# Patient Record
Sex: FEMALE | Race: BLACK OR AFRICAN AMERICAN | NOT HISPANIC OR LATINO | URBAN - METROPOLITAN AREA
[De-identification: names, ages, dates, MRNs, and addresses within clinical notes are randomized per-mention and may not be internally consistent; named-entity substitution may affect disease eponyms.]

---

## 2021-08-28 ENCOUNTER — TELEPHONE ENCOUNTER (OUTPATIENT)
Dept: URBAN - METROPOLITAN AREA CLINIC 13 | Facility: CLINIC | Age: 46
End: 2021-08-28

## 2021-08-29 ENCOUNTER — TELEPHONE ENCOUNTER (OUTPATIENT)
Dept: URBAN - METROPOLITAN AREA CLINIC 13 | Facility: CLINIC | Age: 46
End: 2021-08-29

## 2023-01-12 ENCOUNTER — CLAIMS CREATED FROM THE CLAIM WINDOW (OUTPATIENT)
Dept: URBAN - METROPOLITAN AREA TELEHEALTH 2 | Facility: TELEHEALTH | Age: 48
End: 2023-01-12
Payer: COMMERCIAL

## 2023-01-12 ENCOUNTER — LAB OUTSIDE AN ENCOUNTER (OUTPATIENT)
Dept: URBAN - METROPOLITAN AREA CLINIC 48 | Facility: CLINIC | Age: 48
End: 2023-01-12

## 2023-01-12 ENCOUNTER — DASHBOARD ENCOUNTERS (OUTPATIENT)
Age: 48
End: 2023-01-12

## 2023-01-12 VITALS — HEIGHT: 64 IN | WEIGHT: 207 LBS | BODY MASS INDEX: 35.34 KG/M2

## 2023-01-12 DIAGNOSIS — Z12.11 COLON CANCER SCREENING: ICD-10-CM

## 2023-01-12 DIAGNOSIS — Z80.0 FAMILY HISTORY OF COLON CANCER IN FATHER: ICD-10-CM

## 2023-01-12 DIAGNOSIS — D50.0 IRON DEFICIENCY ANEMIA DUE TO CHRONIC BLOOD LOSS: ICD-10-CM

## 2023-01-12 PROBLEM — 312824007: Status: ACTIVE | Noted: 2023-01-12

## 2023-01-12 PROCEDURE — 99204 OFFICE O/P NEW MOD 45 MIN: CPT | Performed by: INTERNAL MEDICINE

## 2023-01-12 RX ORDER — TIRZEPATIDE 10 MG/.5ML
INJECT 0.5 ML (10 MG) UNDER THE SKIN 1 (ONE) TIME PER WEEK INJECTION, SOLUTION SUBCUTANEOUS
Qty: 2 MILLILITER | Refills: 0 | Status: ACTIVE | COMMUNITY

## 2023-01-12 RX ORDER — FLUTICASONE PROPIONATE 50 UG/1
SPRAY 1 SPRAY INTO EACH NOSTRIL EVERY DAY FOR 30 DAYS SPRAY, METERED NASAL
Qty: 16 GRAM | Refills: 3 | Status: ACTIVE | COMMUNITY

## 2023-01-12 RX ORDER — HYDROCHLOROTHIAZIDE 12.5 MG/1
CAPSULE ORAL
Qty: 90 CAPSULE | Status: ACTIVE | COMMUNITY

## 2023-01-12 RX ORDER — FERROUS SULFATE 325(65) MG
TAKE 1 TABLET BY MOUTH EVERY DAY FOR 90 DAYS TABLET ORAL
Qty: 30 EACH | Refills: 5 | Status: ACTIVE | COMMUNITY

## 2023-01-12 RX ORDER — LORATADINE 10 MG/1
TAKE 1 TABLET BY MOUTH EVERY DAY FOR 90 DAYS TABLET ORAL
Qty: 30 EACH | Refills: 0 | Status: ACTIVE | COMMUNITY

## 2023-01-12 RX ORDER — ESTRADIOL 0.03 MG/D
APPLY 1 PATCH ONCE WEEKLY PATCH TRANSDERMAL
Qty: 4 EACH | Refills: 7 | Status: ACTIVE | COMMUNITY

## 2023-01-12 NOTE — HPI-TODAY'S VISIT:
47 year old female presents for colon consult. Bowel movements are every 3 times a week of formed stool. The patient uses Dulcolax prn and mineral oil once a week. She is chronically anemic and is on oral iron along with a multivitamin with iron. 10/2022 Hgb was 11.2, MCV 84, and CMP was normal. Denies blood in stool, weight changes, or abdominal pain. The patient has not had a period for years. Denies GERD. She had a gastric sleeve in 2016 but anemia was occurring prior. Her father was just diagnosed with colon cancer at age 70. She has never had a colonoscopy.

## 2023-01-28 PROBLEM — 305058001: Status: ACTIVE | Noted: 2023-01-12

## 2023-01-28 PROBLEM — 724556004: Status: ACTIVE | Noted: 2023-01-12

## 2023-02-24 ENCOUNTER — CLAIMS CREATED FROM THE CLAIM WINDOW (OUTPATIENT)
Dept: URBAN - METROPOLITAN AREA CLINIC 4 | Facility: CLINIC | Age: 48
End: 2023-02-24
Payer: COMMERCIAL

## 2023-02-24 ENCOUNTER — OFFICE VISIT (OUTPATIENT)
Dept: URBAN - METROPOLITAN AREA SURGERY CENTER 27 | Facility: SURGERY CENTER | Age: 48
End: 2023-02-24
Payer: COMMERCIAL

## 2023-02-24 DIAGNOSIS — K31.89 OTHER DISEASES OF STOMACH AND DUODENUM: ICD-10-CM

## 2023-02-24 DIAGNOSIS — Z98.84 BARIATRIC SURG STAT-UNSP: ICD-10-CM

## 2023-02-24 DIAGNOSIS — D50.9 ANEMIA: ICD-10-CM

## 2023-02-24 DIAGNOSIS — Z80.0 BROTHER AT YOUNG AGE FAMILY HISTORY OF COLON CANCER: ICD-10-CM

## 2023-02-24 DIAGNOSIS — K31.89 ACQUIRED DEFORMITY OF DUODENUM: ICD-10-CM

## 2023-02-24 PROCEDURE — 45378 DIAGNOSTIC COLONOSCOPY: CPT | Performed by: INTERNAL MEDICINE

## 2023-02-24 PROCEDURE — 88305 TISSUE EXAM BY PATHOLOGIST: CPT | Performed by: PATHOLOGY

## 2023-02-24 PROCEDURE — 88342 IMHCHEM/IMCYTCHM 1ST ANTB: CPT | Performed by: PATHOLOGY

## 2023-02-24 PROCEDURE — 88312 SPECIAL STAINS GROUP 1: CPT | Performed by: PATHOLOGY

## 2023-02-24 PROCEDURE — G8907 PT DOC NO EVENTS ON DISCHARG: HCPCS | Performed by: INTERNAL MEDICINE

## 2023-02-24 PROCEDURE — 43239 EGD BIOPSY SINGLE/MULTIPLE: CPT | Performed by: INTERNAL MEDICINE

## 2023-02-24 RX ORDER — FLUTICASONE PROPIONATE 50 UG/1
SPRAY 1 SPRAY INTO EACH NOSTRIL EVERY DAY FOR 30 DAYS SPRAY, METERED NASAL
Qty: 16 GRAM | Refills: 3 | Status: ACTIVE | COMMUNITY

## 2023-02-24 RX ORDER — TIRZEPATIDE 10 MG/.5ML
INJECT 0.5 ML (10 MG) UNDER THE SKIN 1 (ONE) TIME PER WEEK INJECTION, SOLUTION SUBCUTANEOUS
Qty: 2 MILLILITER | Refills: 0 | Status: ACTIVE | COMMUNITY

## 2023-02-24 RX ORDER — FERROUS SULFATE 325(65) MG
TAKE 1 TABLET BY MOUTH EVERY DAY FOR 90 DAYS TABLET ORAL
Qty: 30 EACH | Refills: 5 | Status: ACTIVE | COMMUNITY

## 2023-02-24 RX ORDER — ESTRADIOL 0.03 MG/D
APPLY 1 PATCH ONCE WEEKLY PATCH TRANSDERMAL
Qty: 4 EACH | Refills: 7 | Status: ACTIVE | COMMUNITY

## 2023-02-24 RX ORDER — HYDROCHLOROTHIAZIDE 12.5 MG/1
CAPSULE ORAL
Qty: 90 CAPSULE | Status: ACTIVE | COMMUNITY

## 2023-02-24 RX ORDER — LORATADINE 10 MG/1
TAKE 1 TABLET BY MOUTH EVERY DAY FOR 90 DAYS TABLET ORAL
Qty: 30 EACH | Refills: 0 | Status: ACTIVE | COMMUNITY